# Patient Record
(demographics unavailable — no encounter records)

---

## 2025-05-12 NOTE — HISTORY OF PRESENT ILLNESS
[de-identified] : JOSÉ MIGUEL ASHLEY is a 77 year old woman with no significant PMH, who presents for Hematology follow up of pernicious anemia.   She presented to [facility] on 7/24/24 with a syncopal episode. Her labs were notable for pancytopenia (0.96 > 7.5 < 13, .8) and positive hemolysis markers (TBili 1.4, LDH 1636, haptoglobin < 20, hypoproliferative reticulocyte index). Her B12 level was profoundly low (< 150), and intrinsic factor antibodies elevated to 210, consistent with severe B12 deficiency secondary to pernicious anemia. Bone marrow biopsy on 8/2/24 showed a cellular marrow (40-50%) with maturing trilineage hematopoiesis, no increase in blast population and megaloblastic changes, which is most likely due to severe vitamin B12 deficiency.  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   She received B12 injections daily x 7 days and was discharged home to complete further injections (plan for weekly x 4, then monthly). GI was consulted inpatient for endoscopy and deferred to outpatient follow up. Her labs on discharge (7/30/24) showed improvement in her cytopenias: 2.16 > 8.2 < 87, .5, ANC 0.51.  Interval History: - She returns for follow up. Her CBC is normal. She continues to receive monthly B12 injections. Overall feels well.    Family History: - Multiple family members with cancer: breast (sister), gastric (sister/niece), head/neck (sister)   Social History: - Lives alone - Retired; previously worked for [employer] - Denies tobacco, alcohol, or drug use.

## 2025-05-12 NOTE — REASON FOR VISIT
[Follow-Up Visit] : a follow-up visit for [Family Member] : family member [FreeTextEntry2] : pernicious anemia

## 2025-05-12 NOTE — ASSESSMENT
[FreeTextEntry1] : JOSÉ MIGUEL ASHLEY is a 77 year old woman with no significant PMH, who presents for Hematology follow up of pernicious anemia.  # Pernicious anemia: She presented to [facility] on 7/24/24 with pancytopenia (0.96 > 7.5 < 13, .8) and positive hemolysis markers (TBili 1.4, LDH 1636, haptoglobin < 20, hypoproliferative reticulocyte index). Her B12 level was profoundly low (< 150), and intrinsic factor antibodies elevated to 210, consistent with severe B12 deficiency secondary to pernicious anemia. - Labs: CBC, CMP, ferritin, iron panel, B12, MMA - Continue B12 injections monthly - Patient should return to clinic in 1 year